# Patient Record
Sex: MALE | Race: WHITE
[De-identification: names, ages, dates, MRNs, and addresses within clinical notes are randomized per-mention and may not be internally consistent; named-entity substitution may affect disease eponyms.]

---

## 2017-12-13 ENCOUNTER — HOSPITAL ENCOUNTER (OUTPATIENT)
Dept: HOSPITAL 45 - C.GI | Age: 82
Discharge: HOME | End: 2017-12-13
Attending: INTERNAL MEDICINE
Payer: COMMERCIAL

## 2017-12-13 VITALS — TEMPERATURE: 97.52 F

## 2017-12-13 VITALS — HEART RATE: 56 BPM | OXYGEN SATURATION: 97 % | DIASTOLIC BLOOD PRESSURE: 82 MMHG | SYSTOLIC BLOOD PRESSURE: 164 MMHG

## 2017-12-13 VITALS
WEIGHT: 180.38 LBS | BODY MASS INDEX: 27.34 KG/M2 | HEIGHT: 67.99 IN | BODY MASS INDEX: 27.34 KG/M2 | WEIGHT: 180.38 LBS | HEIGHT: 67.99 IN | BODY MASS INDEX: 27.34 KG/M2

## 2017-12-13 DIAGNOSIS — D12.0: ICD-10-CM

## 2017-12-13 DIAGNOSIS — D12.3: ICD-10-CM

## 2017-12-13 DIAGNOSIS — Z83.71: ICD-10-CM

## 2017-12-13 DIAGNOSIS — Z79.899: ICD-10-CM

## 2017-12-13 DIAGNOSIS — K57.30: ICD-10-CM

## 2017-12-13 DIAGNOSIS — Z79.82: ICD-10-CM

## 2017-12-13 DIAGNOSIS — Z12.11: Primary | ICD-10-CM

## 2017-12-13 NOTE — ANESTHESIOLOGY PROGRESS NOTE
Anesthesia Post Op Note


Date & Time


Dec 13, 2017 at 15:06





Vital Signs


Pain Intensity:  0





Vital Signs Past 12 Hours








  Date Time  Temp Pulse Resp B/P (MAP) Pulse Ox O2 Delivery O2 Flow Rate FiO2


 


12/13/17 14:55  60 16 132/60 (84) 96 Room Air  


 


12/13/17 14:00 36.4 66 20 167/92 (117) 98 Room Air  











Notes


Mental Status:  alert / awake / arousable, participated in evaluation


Pt Amnestic to Procedure:  Yes


Nausea / Vomiting:  adequately controlled


Pain:  adequately controlled


Airway Patency, RR, SpO2:  stable & adequate


BP & HR:  stable & adequate


Hydration State:  stable & adequate


Anesthetic Complications:  no major complications apparent

## 2017-12-13 NOTE — ENDO HISTORY AND PHYSICAL
History & Physical


Date of Service:


Dec 13, 2017.


Chief Complaint:


HX POLYPS


Referring Physician:


DR. BALL


History of Present Illness


For colonoscopy





Past Surgical History


Hx Internal Defibrillator:  No


Hx Pacemaker:  No


Hx Abdominal Surgery:  No


Hx of Implantable Prosthesis:  No


Hx Post-Op Nausea and Vomiting:  No


Hx Cancer Surgery:  No


Hx Thoracic Surgery:  No


Hx Urinary Tract Surgery:  No





Family History


None





Social History


Smoking Status:  Never Smoker


Hx Substance Use:  No


Hx Alcohol Use:  No





Allergies


Coded Allergies:  


     Tetracycline (Verified  Allergy, Unknown, rash, 12/13/17)





Current Medications





Reported Home Medications








 Medications  Dose


 Route/Sig


 Max Daily Dose Days Date Category


 


 [Metamucil]    1 Dose


 PO HS


    12/7/17 Reported


 


 Soothe (Propylene


 Glycol-Glycerin)


 1 Kai Kai  1 Drop


 OPB PRN


    12/7/17 Reported


 


 Trazodone


  (Trazodone HCl)


 50 Mg Tab  50 Mg


 PO HS PRN


    12/7/17 Reported


 


 Flomax


  (Tamsulosin Hcl)


 0.4 Mg Cap  0.4 Mg


 PO QPM


    12/7/17 Reported


 


 Probiotic


  (Probiotic


 Product) 1 Cap Cap  1 Tab


 PO DAILY


    12/7/17 Reported


 


 Proair Respiclick


  (Albuterol


 Sulfate) 108


 Mcg/Act Aer  2 Puffs


 INH PRN


    12/7/17 Reported


 


 Prilosec


  (Omeprazole) 20


 Mg Capcr  20 Mg


 PO DAILY


    12/7/17 Reported


 


 Hyzaar 25MG/100MG


  (HCTZ/Losartan


 Potassium)  Tab  1 Tab


 PO DAILY


    12/7/17 Reported


 


 Klor-Con


  (Potassium


 Chloride) 20 Meq


 Tabcr  10 Meq


 PO DAILY


    12/7/17 Reported


 


 Flonase Allergy


 Relief


  (Fluticasone


 Propionate


  (Nasal)) 50


 Mcg/Act Spr  2 Sprays


 INTNAS DAILY


    12/7/17 Reported


 


 Flovent Hfa


  (Fluticasone


 Propionate) 120


 Puffs/5280 Mcg


 Aero  2 Puffs


 INH BID


   30 12/7/17 Reported


 


 Zyrtec


  (Cetirizine HCl)


 10 Mg Tab  10 Mg


 PO DAILY


    12/7/17 Reported


 


 Aspirin Ec


  (Aspirin) 81 Mg


 Tab  81 Mg


 PO HS


    12/7/17 Reported


 


 Proscar


  (Finasteride) 5


 Mg Tab  5 Mg


 PO DAILY


    10/5/11 Reported


 


 Mevacor


  (Lovastatin) 40


 Mg Tab  40 Mg


 PO HS


    10/5/11 Reported











Vital Signs


Weight (Kilograms):  81.82


Height (Feet):  5


Height (Inches):  8











  Date Time  Temp Pulse Resp B/P (MAP) Pulse Ox O2 Delivery O2 Flow Rate FiO2


 


12/13/17 14:00 36.4 66 20 167/92 (117) 98 Room Air  











Physical Exam


General Appearance:  WD/WN, + pertinent finding (decreased hearing)


Respiratory/Chest:  


   Respiratory effort:  no dyspnea


Cardiovascular:  


   Heart Auscultation:  RRR


Abdomen:  


   Inspection & Palpation:  soft





Assessment and Plan


Hx polyps for colonoscopy

## 2017-12-13 NOTE — GI REPORT
Procedure Date: 12/13/2017 2:26 PM

Procedure:            Colonoscopy

Indications:          Personal history of colonic polyps

Medicines:            Propofol total dose 200 mg IV, Lidocaine 40 mg IV

Complications:        No immediate complications.

Estimated Blood Loss: Estimated blood loss was minimal.

Procedure:            Pre-Anesthesia Assessment:

                      - Prior to the procedure, a History and Physical was 

                      performed, and patient medications, allergies and 

                      sensitivities were reviewed. The patient's tolerance of 

                      previous anesthesia was reviewed.

                      - The risks and benefits of the procedure and the 

                      sedation options and risks were discussed with the 

                      patient. All questions were answered and informed 

                      consent was obtained.

                      After I obtained informed consent, the scope was passed 

                      under direct vision. Throughout the procedure, the 

                      patient's blood pressure, pulse, and oxygen saturations 

                      were monitored continuously. The scope was introduced 

                      through the anus and advanced to the cecum, identified 

                      by appendiceal orifice and ileocecal valve. The 

                      colonoscopy was somewhat difficult due to significant 

                      looping. Successful completion of the procedure was 

                      aided by applying abdominal pressure. The patient 

                      tolerated the procedure well. The quality of the bowel 

                      preparation was good.

Findings:

     Multiple diverticula were found in the entire colon.

     A 4 mm polyp was found in the transverse colon. The polyp was sessile. 

     The polyp was removed with a cold snare. Resection and retrieval were 

     complete. To prevent bleeding post-intervention, one hemostatic clip was 

     successfully placed (MR conditional). There was no bleeding during, and 

     at the end, of the procedure.

     A 6 mm polyp was found in the cecum. The polyp was sessile. The polyp 

     was removed with a hot snare. Resection and retrieval were complete. 

     Estimated blood loss: none.

Impression:           - Diverticulosis in the entire examined colon.

                      - One 4 mm polyp in the transverse colon, removed with 

                      a cold snare. Resected and retrieved. Clip (MR 

                      conditional) was placed.

                      - One 6 mm polyp in the cecum, removed with a hot 

                      snare. Resected and retrieved.

Recommendation:       - Discharge patient to home (ambulatory).

                      - Continue present medications.

                      - Await pathology results.

                      - Return to primary care physician PRN.

Homer Chacon M.D.

Homer Chacon MD

12/13/2017 2:54:23 PM

This report has been signed electronically.

Note Initiated On: 12/13/2017 2:26 PM

     I attest to the content of the Intraoperative Record and orders 

     documented therein, exceptions below

## 2017-12-13 NOTE — DISCHARGE INSTRUCTIONS
Endoscopy Patient Instructions


Date / Procedure(s) Performed


Dec 13, 2017.


Colonoscopy





Allergy Information


Coded Allergies:  


     Tetracycline (Verified  Allergy, Unknown, rash, 12/13/17)





Discharge Date / Findings


Dec 13, 2017.


Diverticulosis, polyps removed





Medication Instructions


Restart Stopped Medication(s):


resume meds





Reported Home Medications








 Medications  Dose


 Route/Sig


 Max Daily Dose Days Date Category


 


 [Metamucil]    1 Dose


 PO HS


    12/7/17 Reported


 


 Soothe (Propylene


 Glycol-Glycerin)


 1 Kai Kai  1 Drop


 OPB PRN


    12/7/17 Reported


 


 Trazodone


  (Trazodone HCl)


 50 Mg Tab  50 Mg


 PO HS PRN


    12/7/17 Reported


 


 Flomax


  (Tamsulosin Hcl)


 0.4 Mg Cap  0.4 Mg


 PO QPM


    12/7/17 Reported


 


 Probiotic


  (Probiotic


 Product) 1 Cap Cap  1 Tab


 PO DAILY


    12/7/17 Reported


 


 Proair Respiclick


  (Albuterol


 Sulfate) 108


 Mcg/Act Aer  2 Puffs


 INH PRN


    12/7/17 Reported


 


 Prilosec


  (Omeprazole) 20


 Mg Capcr  20 Mg


 PO DAILY


    12/7/17 Reported


 


 Hyzaar 25MG/100MG


  (HCTZ/Losartan


 Potassium)  Tab  1 Tab


 PO DAILY


    12/7/17 Reported


 


 Klor-Con


  (Potassium


 Chloride) 20 Meq


 Tabcr  10 Meq


 PO DAILY


    12/7/17 Reported


 


 Flonase Allergy


 Relief


  (Fluticasone


 Propionate


  (Nasal)) 50


 Mcg/Act Spr  2 Sprays


 INTNAS DAILY


    12/7/17 Reported


 


 Flovent Hfa


  (Fluticasone


 Propionate) 120


 Puffs/5280 Mcg


 Aero  2 Puffs


 INH BID


   30 12/7/17 Reported


 


 Zyrtec


  (Cetirizine HCl)


 10 Mg Tab  10 Mg


 PO DAILY


    12/7/17 Reported


 


 Aspirin Ec


  (Aspirin) 81 Mg


 Tab  81 Mg


 PO HS


    12/7/17 Reported


 


 Proscar


  (Finasteride) 5


 Mg Tab  5 Mg


 PO DAILY


    10/5/11 Reported


 


 Mevacor


  (Lovastatin) 40


 Mg Tab  40 Mg


 PO HS


    10/5/11 Reported











Provider Instructions





Activity Restrictions





-  No exercising or heavy lifting for 24 hours. 


-  Do not drink alcohol the day of the procedure.


-  Do not drive a car or operate machinery until the day after the procedure.


-  Do not make any important decisions or sign important papers in 24 hours 

after the procedure.





Following Day:





-  Return to full activity which may include returning to work/school.





Diet





Start your diet with liquids and light foods (jello, soup, juice, toast).  Then 

eat your usual diet if not nauseated.





Treatment For Common After Affects





For mild abdominal pain, bloating, or excessive gas:





-  Rest


-  Eat lightly


-  Lie on right side





Follow-Up Information


Follow-up with DR. BALL as scheduled





Anesthesia Information





What You Should Know





You have had a procedure that required some medicine to reduce anxiety and 

discomfort. This treatment is called moderate sedation.  


After receiving the treatment, you may be sleepy, but you will be able to 

breathe on your own.  The effects of the treatment may last for several hours.








Follow these instructions along with Activity/Diet recommendations noted above:





*  Do NOT do anything where dizziness or clumsiness would be dangerous.





*  Rest quietly at home today, then you can be up and about tomorrow.





*  Have a responsible person stay with you the rest of today.





*  You may have had an I.V. today.  If so, you may take the dressing off later 

today.





Recommendations


 


Call your doctor if:





*  Trouble breathing 





*  Continuous vomiting for more than 24 hours








*  Temperature above 101 degrees





*  Severe abdominal pain or bloating





*  Pain not relieved by pain medicine ordered





*  There is increased drainage or redness from any incision





*  A large amount of rectal bleeding greater than 2-3 tablespoons. 


   (If you had a polyp/s removed or have hemorrhoids, a small amount of blood -


    from the rectum is to be expected.)





*  You have any unanswered questions or concerns.








IN THE EVENT OF A SERIOUS EMERGENCY, GO TO THE NEAREST EMERGENCY ROOM








       Your discharge instructions were prepared by provider Homer Chacon.





 Patient Instructions Signature Page














Butch aCceres 











Patient (or Guardian) Signature/Date:____________________________________ I 

have read and understand the instructions given to me by my caregivers.








Caregiver/RN/Doctor Signature/Date:____________________________________











The above-named patient and/or guardian has received patient instructions on 

this date.





























+  Original Patient Signature Page (only) stays with chart.  Please make copy 

for patient.

## 2018-03-14 ENCOUNTER — HOSPITAL ENCOUNTER (OUTPATIENT)
Dept: HOSPITAL 45 - C.LABMFLN | Age: 83
Discharge: HOME | End: 2018-03-14
Attending: FAMILY MEDICINE
Payer: COMMERCIAL

## 2018-03-14 DIAGNOSIS — E78.00: ICD-10-CM

## 2018-03-14 DIAGNOSIS — I10: ICD-10-CM

## 2018-03-14 DIAGNOSIS — R97.20: Primary | ICD-10-CM

## 2018-03-14 LAB
ALT SERPL-CCNC: 20 U/L (ref 12–78)
AST SERPL-CCNC: 19 U/L (ref 15–37)
BUN SERPL-MCNC: 18 MG/DL (ref 7–18)
CALCIUM SERPL-MCNC: 9.1 MG/DL (ref 8.5–10.1)
CO2 SERPL-SCNC: 31 MMOL/L (ref 21–32)
CREAT SERPL-MCNC: 0.95 MG/DL (ref 0.6–1.4)
GLUCOSE SERPL-MCNC: 96 MG/DL (ref 70–99)
KETONES UR QL STRIP: 100 MG/DL
PH UR: 166 MG/DL (ref 0–200)
POTASSIUM SERPL-SCNC: 4 MMOL/L (ref 3.5–5.1)
SODIUM SERPL-SCNC: 132 MMOL/L (ref 136–145)